# Patient Record
Sex: MALE | Race: OTHER | NOT HISPANIC OR LATINO | Employment: FULL TIME | URBAN - METROPOLITAN AREA
[De-identification: names, ages, dates, MRNs, and addresses within clinical notes are randomized per-mention and may not be internally consistent; named-entity substitution may affect disease eponyms.]

---

## 2023-08-09 ENCOUNTER — OFFICE VISIT (OUTPATIENT)
Dept: URGENT CARE | Facility: CLINIC | Age: 28
End: 2023-08-09
Payer: COMMERCIAL

## 2023-08-09 VITALS
HEIGHT: 76 IN | WEIGHT: 187 LBS | TEMPERATURE: 98 F | DIASTOLIC BLOOD PRESSURE: 70 MMHG | RESPIRATION RATE: 20 BRPM | BODY MASS INDEX: 22.77 KG/M2 | HEART RATE: 78 BPM | SYSTOLIC BLOOD PRESSURE: 112 MMHG | OXYGEN SATURATION: 98 %

## 2023-08-09 DIAGNOSIS — R36.9 PENILE DISCHARGE: ICD-10-CM

## 2023-08-09 DIAGNOSIS — R35.0 URINARY FREQUENCY: ICD-10-CM

## 2023-08-09 DIAGNOSIS — N34.2 URETHRITIS: Primary | ICD-10-CM

## 2023-08-09 LAB
BILIRUB UR QL STRIP: POSITIVE
GLUCOSE UR QL STRIP: NEGATIVE
KETONES UR QL STRIP: NEGATIVE
LEUKOCYTE ESTERASE UR QL STRIP: NEGATIVE
PH, POC UA: 6
POC BLOOD, URINE: POSITIVE
POC NITRATES, URINE: NEGATIVE
PROT UR QL STRIP: POSITIVE
SP GR UR STRIP: 1.02 (ref 1–1.03)
UROBILINOGEN UR STRIP-ACNC: 0.2 (ref 0.3–2.2)

## 2023-08-09 PROCEDURE — 81003 POCT URINALYSIS, DIPSTICK, AUTOMATED, W/O SCOPE: ICD-10-PCS | Mod: QW,S$GLB,,

## 2023-08-09 PROCEDURE — 99204 OFFICE O/P NEW MOD 45 MIN: CPT | Mod: S$GLB,,,

## 2023-08-09 PROCEDURE — 99204 PR OFFICE/OUTPT VISIT, NEW, LEVL IV, 45-59 MIN: ICD-10-PCS | Mod: S$GLB,,,

## 2023-08-09 PROCEDURE — 81003 URINALYSIS AUTO W/O SCOPE: CPT | Mod: QW,S$GLB,,

## 2023-08-09 NOTE — PROGRESS NOTES
"Subjective:      Patient ID: Leonor Adams is a 28 y.o. male.    Vitals:  height is 6' 4" (1.93 m) and weight is 84.8 kg (187 lb). His temperature is 97.9 °F (36.6 °C). His blood pressure is 112/70 and his pulse is 78. His respiration is 20 and oxygen saturation is 98%.     Chief Complaint: Urinary Frequency    Leonor, has a history of BPH, presents to clinic with a chief complaint of urinary urgency, frequency, and dysuria for the last week.  He reports this is the 2nd episode this year.  Patient reports he also is having clear, 2 white penile discharge.  Patient reports he was sexually active with 1 partner.  He is tried taking ibuprofen for his symptoms with no relief.  Patient states he has not been taking Flomax as prescribed. On chart review, patient has been noted to have pelvic floor dysfunction.  He has been educated that we will withhold treatment until culture returns positive.  He has been instructed to follow up with his urologist.  He is also been instructed to start taking Flomax as directed.    Urinary Frequency   This is a recurrent problem. The current episode started in the past 7 days. The problem occurs every urination. The problem has been gradually improving. The quality of the pain is described as burning. The pain is moderate. He is Sexually active. There is No history of pyelonephritis. Associated symptoms include a discharge, frequency and urgency. Pertinent negatives include no chills, flank pain, nausea, vomiting, rash or withholding. He has tried NSAIDs for the symptoms. The treatment provided mild relief. His past medical history is significant for recurrent UTIs and urinary stasis.       Constitution: Negative for chills and sweating.   HENT:  Negative for congestion, postnasal drip and sinus pressure.    Neck: Negative for painful lymph nodes.   Cardiovascular:  Negative for palpitations.   Eyes:  Negative for double vision and blurred vision.   Respiratory:  Negative for chest " tightness and cough.    Gastrointestinal:  Negative for nausea, vomiting and diarrhea.   Endocrine: cold intolerance and heat intolerance.   Genitourinary:  Positive for dysuria, frequency, urgency and penile discharge. Negative for flank pain.   Musculoskeletal:  Negative for pain.   Skin:  Negative for rash.   Allergic/Immunologic: Negative for environmental allergies and seasonal allergies.   Neurological:  Negative for dizziness, history of vertigo and altered mental status.   Hematologic/Lymphatic: Negative for swollen lymph nodes and easy bruising/bleeding. Does not bruise/bleed easily.   Psychiatric/Behavioral:  Negative for altered mental status.       Objective:     Physical Exam   Constitutional: He is oriented to person, place, and time. He appears well-developed. He is cooperative.  Non-toxic appearance. He does not appear ill. No distress. normal  HENT:   Head: Normocephalic and atraumatic.   Ears:   Right Ear: Hearing, tympanic membrane, external ear and ear canal normal.   Left Ear: Hearing, tympanic membrane, external ear and ear canal normal.   Nose: Nose normal. No mucosal edema or nasal deformity. No epistaxis. Right sinus exhibits no maxillary sinus tenderness and no frontal sinus tenderness. Left sinus exhibits no maxillary sinus tenderness and no frontal sinus tenderness.   Mouth/Throat: Uvula is midline, oropharynx is clear and moist and mucous membranes are normal. Mucous membranes are moist. No trismus in the jaw. Normal dentition. No uvula swelling. No posterior oropharyngeal erythema. Oropharynx is clear.   Eyes: Conjunctivae and lids are normal. Pupils are equal, round, and reactive to light. Extraocular movement intact   Neck: Trachea normal and phonation normal. Neck supple.   Cardiovascular: Normal rate, regular rhythm, normal heart sounds and normal pulses.   Pulmonary/Chest: Effort normal and breath sounds normal.   Abdominal: Normal appearance. Soft. flat abdomen There is no  abdominal tenderness. There is no left CVA tenderness and no right CVA tenderness.   Musculoskeletal: Normal range of motion.         General: Normal range of motion.   Neurological: no focal deficit. He is alert and oriented to person, place, and time. He exhibits normal muscle tone.   Skin: Skin is warm, dry, intact and not diaphoretic. Capillary refill takes 2 to 3 seconds.   Psychiatric: His speech is normal and behavior is normal. Mood, judgment and thought content normal.   Nursing note and vitals reviewed.      Assessment:     1. Urethritis    2. Urinary frequency    3. Penile discharge        Plan:       Urethritis  -     CULTURE, URINE    Urinary frequency  -     POCT Urinalysis, Dipstick, Automated, W/O Scope  -     CULTURE, URINE    Penile discharge  -     CT/NG, T. vaginalis; Future; Expected date: 08/09/2023  -     CULTURE, URINE        Pelvic exam deferred by patient

## 2023-08-09 NOTE — PATIENT INSTRUCTIONS
A urine culture will be sent off for you.  You will be contacted once resulted.  We will begin treatment once bacteria has been found.  Begin taking Flomax as prescribed.  Follow-up with Urology

## 2023-08-13 LAB
BACTERIA UR CULT: NO GROWTH
BACTERIA UR CULT: NORMAL
C TRACH RRNA SPEC QL NAA+PROBE: NEGATIVE
N GONORRHOEA RRNA SPEC QL NAA+PROBE: NEGATIVE
T VAGINALIS RRNA SPEC QL NAA+PROBE: NEGATIVE